# Patient Record
(demographics unavailable — no encounter records)

---

## 2025-01-08 NOTE — PHYSICAL EXAM
[Absent] : CVAT: absent [Normal] : Mood and affect: Normal [FreeTextEntry1] : NAD [de-identified] : JOBY [de-identified] : no edema [de-identified] : soft NT/ND [de-identified] : No lesions seen, old excision area is intact [Fully active, able to carry on all pre-disease performance without restriction] : Status 0 - Fully active, able to carry on all pre-disease performance without restriction

## 2025-01-08 NOTE — DISCUSSION/SUMMARY
[Reviewed Clinical Lab Test(s)] : Results of clinical tests were reviewed. [Reviewed Radiology Report(s)] : Radiology reports were reviewed. [Visit Time ___ Minutes] : [unfilled] minutes [Face to Face Time___ Minutes] : with [unfilled] minutes in face to face consultation. [FreeTextEntry1] : Again discussed the pathology results from her WLE. The DOI was 2 mm which is technically IA2. The option of radical excision was previously entertained, but at this point it seems more reasonable to continue observation. Questions answered, she is in agreement with the plan.  Return in 6 months.

## 2025-01-08 NOTE — HISTORY OF PRESENT ILLNESS
[FreeTextEntry1] : 58 y/o P1 here for post op  Patient was well till about 2021 at which point she was told she had lichen s. and given clobetasol. The steroids helped somewhat, but she still had a lesion which prompted Bx in 6/2023 and excision 3 days later (CARLEEN 3)  More recently she developed pruritus and some pain in the L side of the vulva and was recommended WLE which was done on 7/18/24  Final path was CARLEEN 3 with 2mm focus of invasion at 4-5 o'clock  No complaints, her Sx have completely resolved. Offered radical excision vs observation and elected the later.

## 2025-01-08 NOTE — REVIEW OF SYSTEMS
[Negative] : Musculoskeletal [Rash] : no rash noted [Ulcer] : no ulcer was seen [Syncope] : no syncope noted [Neuropathy] : no neuropathy [Depression] : no depression [Diabetes] : no diabetes mellitus [Hematuria] : no hematuria [Dysuria] : no dysuria [Abn Vag Bleeding] : no abnormal vaginal bleeding [Incontinence] : no incontinence [Normal Sexual Function] : normal sexual function [Fatigue] : no fatigue [Recent Wt Gain ___ Lbs] : no recent weight gain [Recent Wt Loss___ Lbs] : no recent weight loss [Chest Pain] : no chest pain [PASCUAL] : no dyspnea on exertion [Wheezing] : no wheezing [SOB on Exertion] : no shortness of breath during exertion [Bloody Stools] : no bloody stools [Nausea] : no nausea/vomitting [Muscle Weakness] : no muscle weakness

## 2025-07-08 NOTE — PHYSICAL EXAM
[Absent] : CVAT: absent [Normal] : Mood and affect: Normal [FreeTextEntry1] : NAD [de-identified] : JOBY [de-identified] : no edema [de-identified] : soft NT/ND [de-identified] : No lesions seen [de-identified] : normal [de-identified] : 1x2 cm [de-identified] : 6 weeks [de-identified] : no masses [Fully active, able to carry on all pre-disease performance without restriction] : Status 0 - Fully active, able to carry on all pre-disease performance without restriction

## 2025-07-08 NOTE — REVIEW OF SYSTEMS
[Negative] : Musculoskeletal [Normal Sexual Function] : normal sexual function [Rash] : no rash noted [Ulcer] : no ulcer was seen [Syncope] : no syncope noted [Neuropathy] : no neuropathy [Depression] : no depression [Diabetes] : no diabetes mellitus [Hematuria] : no hematuria [Dysuria] : no dysuria [Abn Vag Bleeding] : no abnormal vaginal bleeding [Incontinence] : no incontinence [Fatigue] : no fatigue [Recent Wt Gain ___ Lbs] : no recent weight gain [Recent Wt Loss___ Lbs] : no recent weight loss [Chest Pain] : no chest pain [PASCUAL] : no dyspnea on exertion [Wheezing] : no wheezing [SOB on Exertion] : no shortness of breath during exertion [Bloody Stools] : no bloody stools [Nausea] : no nausea/vomitting [Muscle Weakness] : no muscle weakness

## 2025-07-08 NOTE — DISCUSSION/SUMMARY
[Reviewed Clinical Lab Test(s)] : Results of clinical tests were reviewed. [Reviewed Radiology Report(s)] : Radiology reports were reviewed. [Visit Time ___ Minutes] : [unfilled] minutes [Face to Face Time___ Minutes] : with [unfilled] minutes in face to face consultation. [FreeTextEntry1] : Discussed the importance of regular surveillance. PAP not due till 2028, mammogram next month. Instructed to call if she should have any abnormal Sx. All questions answered.  Return 6 months

## 2025-07-08 NOTE — HISTORY OF PRESENT ILLNESS
[FreeTextEntry1] : 60 y/o P1 with stage IA2 vulvar cancer  Patient was well till about 2021 at which point she was told she had lichen s. and given Clobetasol. The steroids helped somewhat, but she still had a lesion which prompted Bx in 6/2023 and excision 3 days later (CARLEEN 3)  WLE done on 7/18/24 :Final path was CARLEEN 3 with 2mm focus of invasion at 4-5 o'clock  No complaints, her Sx have completely resolved. Offered radical excision vs observation and elected the later.

## 2025-07-16 NOTE — DISCUSSION/SUMMARY
[FreeTextEntry1] : COPD MILD REDUCTION IN DLCO/ ACTIVE SMOKER CHEST CT SCREENING AS NEEDED INHALERS SMOKING COUNSELING